# Patient Record
Sex: MALE | Race: WHITE | Employment: FULL TIME
[De-identification: names, ages, dates, MRNs, and addresses within clinical notes are randomized per-mention and may not be internally consistent; named-entity substitution may affect disease eponyms.]

---

## 2022-08-24 ENCOUNTER — TELEPHONE (OUTPATIENT)
Dept: INTERNAL MEDICINE CLINIC | Facility: CLINIC | Age: 47
End: 2022-08-24

## 2022-08-24 NOTE — TELEPHONE ENCOUNTER
[facility] for patient - Dr. Jeremie Rios is not accepting new patients at this time, if he would like to see Dr. Sloane Box, that can be arranged. Please call the office at [phone] to schedule.

## 2022-08-24 NOTE — TELEPHONE ENCOUNTER
----- Message from Rosalind Coronado sent at 8/11/2022  3:58 PM EDT -----  Subject: Appointment Request    Reason for Call: New Patient/New to Provider Appointment needed: New   Patient Request Appointment    QUESTIONS    Reason for appointment request? Available appointments did not meet   patient need     Additional Information for Provider? Easton Reid is inquiring on becoming a NP   for Dr. Ryan Lagunas if possible he would like to see Dr. Gregg Carlsonald is first   choice as his wife Ambrocio Villa ([id number]) sees him, but his question would be if   he would be able to have a physical on first appt as it is needed for   insurance purposes.  Please call to let him know the answers to these   question and set him up.  ---------------------------------------------------------------------------  --------------  [address] [facility]  [phone]; OK to leave message on voicemail  ---------------------------------------------------------------------------  --------------  SCRIPT ANSWERS  COVID Screen: Sony Washburn

## 2022-09-13 NOTE — PROGRESS NOTES
Curtis Diaz M.D. Family Medicine and Sports Medicine  [address] [address] [address] [place] [address]  Office : [phone]  Fax : [phone]    Chief Complaint   Patient presents with    New Patient     The pt does have a few things he would like to discuss today, but did not share those things with me. History of Present Illness:  Lesvia BowmanChristine Capellan is a 52 y.o. male. HPI  52 y.o. male present to establish care with a new provider. Has not seen PCP in years. Was seen at [address] [place] [address] previously. Past medical history of:  GERD: Endorses hx of reflux and esophageal spasms. Has avoided caffeine. S/p EGD and biopsy. Hx of achalasia s/p dilation >5 years ago. Takes tagamet daily. Completed by Gastroenterology associated per patient. Currently well controlled. HLD: Not on any medications. Admits to some chronic elevations. Fhx of lipitor. Admits to poor diet at times including sweets. Elevated BP: Hx of borderline Bps. Endorses stress with work. Issues to address:  Right hip pain around 5 years. Worse in past year. Pt runs 1x per week. Does mountain biking 1x per week. Pain along anterior hip in C sign. Exacerbated after exercise. BL hand pain:  Family hx of arthritis in hands in feet  Frequent use of hands (lifting, chopping wood, riding motorcycle). Will have pain along MCP of right 5th and MCP of left 2nd  Will also have pain along right middle phalanx  Takes ibuprofen, helps some      Past Medical History:  Past Medical History:   Diagnosis Date    GERD (gastroesophageal reflux disease)      Past Surgical History:  Past Surgical History:   Procedure Laterality Date    TONSILLECTOMY       Allergies:   No Known Allergies  Medications:   Prior to Admission medications    Medication Sig Start Date End Date Taking?  Authorizing Provider   Cimetidine (TAGAMET PO) Take 1 tablet by mouth every evening   Yes Historical Provider, MD   terbinafine (LAMISIL) 250 MG tablet Take 1 tablet by mouth daily 9/15/22 12/14/22 Yes Jayda Ruiz MD      Social History:  Social History     Tobacco Use    Smoking status: Never    Smokeless tobacco: Never   Substance Use Topics    Alcohol use: Yes     Comment: 3-4 a week - mix     Family History  Family History   Problem Relation Age of Onset    Arthritis Mother     High Cholesterol Mother     High Blood Pressure Father     Heart Disease Father        ROS:  All other ROS were negative. See HPI for pertinent ROS      Vital Signs  /78 (Site: Right Upper Arm, Position: Sitting, Cuff Size: Large Adult)   Pulse 73   Temp 98.4 °F (36.9 °C) (Temporal)   Ht 5' 10.2\" (1.783 m)   Wt 193 lb 3.2 oz (87.6 kg)   SpO2 98%   BMI 27.56 kg/m²   Body mass index is 27.56 kg/m². Physical Exam  Vitals and nursing note reviewed. Constitutional:       General: He is not in acute distress. Appearance: He is normal weight. He is not ill-appearing. HENT:      Head: Normocephalic and atraumatic. Eyes:      Extraocular Movements: Extraocular movements intact. Conjunctiva/sclera: Conjunctivae normal.   Cardiovascular:      Rate and Rhythm: Normal rate and regular rhythm. Pulses: Normal pulses. Heart sounds: Normal heart sounds. Pulmonary:      Effort: Pulmonary effort is normal.      Breath sounds: Normal breath sounds. Musculoskeletal:         General: No deformity. Comments: Right hip: Decreased range of motion diffusely compared to left, specifically noted with DENISE and straight leg raise. Pain with FADIR. Mild discomfort with logroll. Mild tenderness to palpation along the distal gluteus medius/piriformis   Skin:     General: Skin is warm. Comments: Thickening and yellowing of bilateral great toenails   Neurological:      General: No focal deficit present. Mental Status: He is alert and oriented to person, place, and time. Mental status is at baseline. Psychiatric:         Mood and Affect: Mood normal.         Behavior: Behavior normal.       PMH, PSH, SHx, FHx, Allergies, Medications reviewed and updated as indicated    Assessment/Plan:  Tylor Gonzales was seen today for new patient. Diagnoses and all orders for this visit:    Establishing care with new doctor, encounter for  Patient presents to establish care with a new doctor. The  PMH, Fhx, Shx, Allergies, Medications were reviewed from any prior documentation available as well as discussed with the patient. This history has been documented into the chart. Recommended lifestyle changes including proper diet and exercise were discussed. Recommended screenings and baseline labs were reviewed with patient based off age and gender. All questions were answered at this time. Preventative health care  -     CBC with Auto Differential; Future  -     Comprehensive Metabolic Panel; Future  -     Lipid Panel; Future  -     Hemoglobin A1C; Future    Gastroesophageal reflux disease, unspecified whether esophagitis present  Endorses hx of reflux and esophageal spasms. Has avoided caffeine. S/p EGD and biopsy. Hx of achalasia s/p dilation >5 years ago. Takes tagamet daily. Completed by Gastroenterology associated per patient. Currently well controlled. Screening for diabetes mellitus  -     Comprehensive Metabolic Panel; Future  -     Hemoglobin A1C; Future    Encounter for lipid screening for cardiovascular disease  Mixed hyperlipidemia  -     CBC with Auto Differential; Future  -     Comprehensive Metabolic Panel; Future  -     Lipid Panel; Future  -     Hemoglobin A1C; Future    Overweight (BMI 25.0-29. 9)    Family history of rheumatoid arthritis  -     MEET, Direct, w/Reflex; Future  -     C-Reactive Protein; Future  -     Rheumatoid Factor; Future  -     Sedimentation Rate;  Future    Diffuse arthralgia  Patient describes arthralgias in bilateral hands, specifically along the MCP joints of the right fifth and left second. Family history of autoimmune disease. We will check baseline screening  -     MEET, Direct, w/Reflex; Future  -     C-Reactive Protein; Future  -     Rheumatoid Factor; Future  -     Sedimentation Rate; Future    Right hip pain  Chronic right hip pain. Patient certainly has mobility issues with decreased external rotation, hamstring tightness and decreased hip flexion on the right compared to left. History and physical also consistent with likely some intra-articular issue, consideration for hip arthritis. He wishes to hold on physical therapy. Home exercises and resources provided. Will check x-ray. Discussed as needed analgesics and conservative measures  -     XR HIP BILATERAL W AP PELVIS (2 VIEWS); Future    Onychomycosis  Bilateral great toenail thickening and yellowing. Discussed options. Will check CMP. Terbinafine for 12 weeks. -     terbinafine (LAMISIL) 250 MG tablet; Take 1 tablet by mouth daily    Prostate cancer screening  -     PSA Screening; Future        Return in about 6 weeks (around 10/27/2022), or if symptoms worsen or fail to improve, for right hip, BP recheck, lab review. 61 minutes was spent on the day of this encounter including face to face time with patient, chart review, reviewing pt hx, ordering meds/tests/procedures, documenting, and/or interpreting results.     __  Brodie Loving M.D.

## 2022-09-15 ENCOUNTER — OFFICE VISIT (OUTPATIENT)
Dept: INTERNAL MEDICINE CLINIC | Facility: CLINIC | Age: 47
End: 2022-09-15
Payer: COMMERCIAL

## 2022-09-15 ENCOUNTER — TELEPHONE (OUTPATIENT)
Dept: INTERNAL MEDICINE CLINIC | Facility: CLINIC | Age: 47
End: 2022-09-15

## 2022-09-15 ENCOUNTER — HOSPITAL ENCOUNTER (OUTPATIENT)
Dept: GENERAL RADIOLOGY | Age: 47
Discharge: HOME | End: 2022-09-18
Payer: COMMERCIAL

## 2022-09-15 VITALS
SYSTOLIC BLOOD PRESSURE: 136 MMHG | DIASTOLIC BLOOD PRESSURE: 78 MMHG | WEIGHT: 193.2 LBS | HEIGHT: 70 IN | BODY MASS INDEX: 27.66 KG/M2 | OXYGEN SATURATION: 98 % | HEART RATE: 73 BPM | TEMPERATURE: 98.4 F

## 2022-09-15 DIAGNOSIS — Z82.61 FAMILY HISTORY OF RHEUMATOID ARTHRITIS: ICD-10-CM

## 2022-09-15 DIAGNOSIS — K21.9 GASTROESOPHAGEAL REFLUX DISEASE, UNSPECIFIED WHETHER ESOPHAGITIS PRESENT: ICD-10-CM

## 2022-09-15 DIAGNOSIS — M25.50 DIFFUSE ARTHRALGIA: ICD-10-CM

## 2022-09-15 DIAGNOSIS — Z13.6 ENCOUNTER FOR LIPID SCREENING FOR CARDIOVASCULAR DISEASE: ICD-10-CM

## 2022-09-15 DIAGNOSIS — Z76.89 ESTABLISHING CARE WITH NEW DOCTOR, ENCOUNTER FOR: Primary | ICD-10-CM

## 2022-09-15 DIAGNOSIS — Z12.5 PROSTATE CANCER SCREENING: ICD-10-CM

## 2022-09-15 DIAGNOSIS — E66.3 OVERWEIGHT (BMI 25.0-29.9): ICD-10-CM

## 2022-09-15 DIAGNOSIS — M25.551 RIGHT HIP PAIN: ICD-10-CM

## 2022-09-15 DIAGNOSIS — Z13.220 ENCOUNTER FOR LIPID SCREENING FOR CARDIOVASCULAR DISEASE: ICD-10-CM

## 2022-09-15 DIAGNOSIS — E78.2 MIXED HYPERLIPIDEMIA: ICD-10-CM

## 2022-09-15 DIAGNOSIS — Z13.1 SCREENING FOR DIABETES MELLITUS: ICD-10-CM

## 2022-09-15 DIAGNOSIS — B35.1 ONYCHOMYCOSIS: ICD-10-CM

## 2022-09-15 DIAGNOSIS — Z00.00 PREVENTATIVE HEALTH CARE: ICD-10-CM

## 2022-09-15 PROCEDURE — 99205 OFFICE O/P NEW HI 60 MIN: CPT | Performed by: FAMILY MEDICINE

## 2022-09-15 PROCEDURE — 73521 X-RAY EXAM HIPS BI 2 VIEWS: CPT

## 2022-09-15 RX ORDER — TERBINAFINE HYDROCHLORIDE 250 MG/1
250 TABLET ORAL DAILY
Qty: 90 TABLET | Refills: 0 | Status: SHIPPED | OUTPATIENT
Start: 2022-09-15 | End: 2022-12-14

## 2022-09-15 ASSESSMENT — ANXIETY QUESTIONNAIRES
3. WORRYING TOO MUCH ABOUT DIFFERENT THINGS: 0
5. BEING SO RESTLESS THAT IT IS HARD TO SIT STILL: 0
4. TROUBLE RELAXING: 0
7. FEELING AFRAID AS IF SOMETHING AWFUL MIGHT HAPPEN: 0
6. BECOMING EASILY ANNOYED OR IRRITABLE: 0
2. NOT BEING ABLE TO STOP OR CONTROL WORRYING: 0
1. FEELING NERVOUS, ANXIOUS, OR ON EDGE: 0
GAD7 TOTAL SCORE: 0

## 2022-09-15 ASSESSMENT — PATIENT HEALTH QUESTIONNAIRE - PHQ9
1. LITTLE INTEREST OR PLEASURE IN DOING THINGS: 0
2. FEELING DOWN, DEPRESSED OR HOPELESS: 0
SUM OF ALL RESPONSES TO PHQ QUESTIONS 1-9: 0
SUM OF ALL RESPONSES TO PHQ9 QUESTIONS 1 & 2: 0

## 2022-09-15 NOTE — TELEPHONE ENCOUNTER
Call patient regarding results. Discussed prior history of an acetabular injury which she does relay a injury in his 25s that may be contributing to the bone fragments. Discussed no significant arthritis noted. Discussed additional work-up options including MRI or ultrasound looking for labral injury. As of now, he will work on exercises discussed in clinic.   Follow-up for recheck in a month

## 2022-09-15 NOTE — PATIENT INSTRUCTIONS
Patient Education        Hip Arthritis: Care Instructions  Overview     Arthritis, also called osteoarthritis, is a breakdown of the tissue (cartilage) that cushions your joints. Many people have some arthritis as they age. When the cartilage in your hip joints wears down, your hip bone rubs against the hip socket. This causes pain and stiffness. Work with your doctor to find the right mix of treatments for your arthritis. There are things you can do at home to protect your hip joints, ease your pain, and help you stay active. But if your arthritis becomes so bad that you cannot walk, you may need surgery to replace the hip joint. Follow-up care is a key part of your treatment and safety. Be sure to make and go to all appointments, and call your doctor if you are having problems. It's also a good idea to know your test results and keep a list of the medicines you take. How can you care for yourself at home? Stay at a healthy weight. Being overweight puts extra strain on your hip joints. Talk to your doctor or physical therapist about exercises that will help ease hip pain. These tips may help. Stretch to help prevent stiffness and to prevent injury before you exercise. You may enjoy gentle forms of yoga to help keep your joints and muscles flexible. Walk instead of jog. Other types of exercise that are less stressful on the joints include riding a bike, swimming, and doing water exercise. Lift weights. Strong muscles help reduce stress on your joints. Stronger thigh muscles, for example, take some of the stress off of the knees and hips. Learn the right way to lift weights so you do not make joint pain worse. Take pain medicines exactly as directed. If the doctor gave you a prescription medicine for pain, take it as prescribed. If you are not taking a prescription pain medicine, ask your doctor if you can take an over-the-counter medicine.   Use a cane, crutch, walker, or another device if you need help to get around. These can help rest your hips. You also can use other things to make life easier, such as a higher toilet seat. Do not sit in low chairs, which can make it painful to get up. Put heat or cold on your sore hips as needed. Use whichever helps you most. You also can go back and forth between hot and cold packs. Apply heat 2 or 3 times a day for 20 to 30 minutes--using a heating pad, hot shower, or hot pack--to relieve pain and stiffness. Put ice or a cold pack on your sore hips for 10 to 20 minutes at a time to numb the area. Put a thin cloth between the ice and your skin. Think about talking to your doctor about using capsaicin, a cream you apply to the skin for pain relief. When should you call for help? Call your doctor now or seek immediate medical care if:    You have sudden swelling, warmth, or pain in any joint. You have joint pain and a fever or rash. You have such bad pain that you cannot use the joint. Watch closely for changes in your health, and be sure to contact your doctor if:    You have mild joint symptoms that continue even with more than 6 weeks of care at home. You do not get better as expected. You have stomach pain or other problems with your medicine. Where can you learn more? Go to https://[employer]. org and sign in to your [employer] account. Enter P450 in the [facility] box to learn more about \"Hip Arthritis: Care Instructions. \"     If you do not have an account, please click on the \"Sign Up Now\" link. Current as of: March 9, 2022               Content Version: 13.4  © 1492-2396 Healthwise, Incorporated. Care instructions adapted under license by [facility] ([facility]). If you have questions about a medical condition or this instruction, always ask your healthcare professional. [address] any warranty or liability for your use of this information.

## 2022-09-16 LAB
ALBUMIN SERPL-MCNC: 4.4 G/DL (ref 3.5–5)
ALBUMIN/GLOB SERPL: 1.6 {RATIO} (ref 1.2–3.5)
ALP SERPL-CCNC: 49 U/L (ref 50–136)
ALT SERPL-CCNC: 43 U/L (ref 12–65)
ANION GAP SERPL CALC-SCNC: 4 MMOL/L (ref 4–13)
AST SERPL-CCNC: 16 U/L (ref 15–37)
BASOPHILS # BLD: 0.1 K/UL (ref 0–0.2)
BASOPHILS NFR BLD: 1 % (ref 0–2)
BILIRUB SERPL-MCNC: 0.7 MG/DL (ref 0.2–1.1)
BUN SERPL-MCNC: 11 MG/DL (ref 6–23)
CALCIUM SERPL-MCNC: 9.5 MG/DL (ref 8.3–10.4)
CHLORIDE SERPL-SCNC: 104 MMOL/L (ref 101–110)
CHOLEST SERPL-MCNC: 253 MG/DL
CO2 SERPL-SCNC: 29 MMOL/L (ref 21–32)
CREAT SERPL-MCNC: 1.1 MG/DL (ref 0.8–1.5)
CRP SERPL-MCNC: <0.3 MG/DL (ref 0–0.9)
DIFFERENTIAL METHOD BLD: NORMAL
EOSINOPHIL # BLD: 0.1 K/UL (ref 0–0.8)
EOSINOPHIL NFR BLD: 2 % (ref 0.5–7.8)
ERYTHROCYTE [DISTWIDTH] IN BLOOD BY AUTOMATED COUNT: 12.7 % (ref 11.9–14.6)
ERYTHROCYTE [SEDIMENTATION RATE] IN BLOOD: 2 MM/HR (ref 0–20)
EST. AVERAGE GLUCOSE BLD GHB EST-MCNC: 111 MG/DL
GLOBULIN SER CALC-MCNC: 2.8 G/DL (ref 2.3–3.5)
GLUCOSE SERPL-MCNC: 100 MG/DL (ref 65–100)
HBA1C MFR BLD: 5.5 % (ref 4.8–5.6)
HCT VFR BLD AUTO: 46.1 % (ref 41.1–50.3)
HDLC SERPL-MCNC: 57 MG/DL (ref 40–60)
HDLC SERPL: 4.4 {RATIO}
HGB BLD-MCNC: 15 G/DL (ref 13.6–17.2)
IMM GRANULOCYTES # BLD AUTO: 0 K/UL (ref 0–0.5)
IMM GRANULOCYTES NFR BLD AUTO: 0 % (ref 0–5)
LDLC SERPL CALC-MCNC: 176.2 MG/DL
LYMPHOCYTES # BLD: 1.6 K/UL (ref 0.5–4.6)
LYMPHOCYTES NFR BLD: 29 % (ref 13–44)
MCH RBC QN AUTO: 30.1 PG (ref 26.1–32.9)
MCHC RBC AUTO-ENTMCNC: 32.5 G/DL (ref 31.4–35)
MCV RBC AUTO: 92.4 FL (ref 79.6–97.8)
MONOCYTES # BLD: 0.5 K/UL (ref 0.1–1.3)
MONOCYTES NFR BLD: 9 % (ref 4–12)
NEUTS SEG # BLD: 3.1 K/UL (ref 1.7–8.2)
NEUTS SEG NFR BLD: 59 % (ref 43–78)
NRBC # BLD: 0 K/UL (ref 0–0.2)
PLATELET # BLD AUTO: 242 K/UL (ref 150–450)
PMV BLD AUTO: 10.4 FL (ref 9.4–12.3)
POTASSIUM SERPL-SCNC: 3.9 MMOL/L (ref 3.5–5.1)
PROT SERPL-MCNC: 7.2 G/DL (ref 6.3–8.2)
PSA SERPL-MCNC: 1 NG/ML
RBC # BLD AUTO: 4.99 M/UL (ref 4.23–5.6)
RHEUMATOID FACT SER QL LA: NEGATIVE
SODIUM SERPL-SCNC: 137 MMOL/L (ref 138–145)
TRIGL SERPL-MCNC: 99 MG/DL (ref 35–150)
VLDLC SERPL CALC-MCNC: 19.8 MG/DL (ref 6–23)
WBC # BLD AUTO: 5.4 K/UL (ref 4.3–11.1)

## 2022-09-17 LAB — ANA SER QL: NEGATIVE

## 2022-09-19 ENCOUNTER — TELEPHONE (OUTPATIENT)
Dept: INTERNAL MEDICINE CLINIC | Facility: CLINIC | Age: 47
End: 2022-09-19

## 2022-09-19 NOTE — TELEPHONE ENCOUNTER
Pt was calling back about results, states that a voicemail was left for him. He also says that it is okay for a detailed message to be left on his voicemail.

## 2022-11-01 NOTE — PROGRESS NOTES
Medications:   Prior to Admission medications    Medication Sig Start Date End Date Taking? Authorizing Provider   Cimetidine (TAGAMET PO) Take 1 tablet by mouth every evening   Yes Historical Provider, MD   terbinafine (LAMISIL) 250 MG tablet Take 1 tablet by mouth daily 9/15/22 12/14/22 Yes Renny Becker MD        ROS:  All other pertinent ROS are negative. See HPI for pertinent ROS    Vital Signs  /74 (Site: Right Upper Arm, Position: Sitting, Cuff Size: Large Adult)   Pulse 73   Temp 98.5 °F (36.9 °C) (Temporal)   Ht 5' 10.2\" (1.783 m)   Wt 194 lb 3.2 oz (88.1 kg)   SpO2 98%   BMI 27.71 kg/m²   Body mass index is 27.71 kg/m². Physical Exam  Vitals and nursing note reviewed. Constitutional:       General: He is not in acute distress. Appearance: He is normal weight. He is not ill-appearing. HENT:      Head: Normocephalic and atraumatic. Eyes:      Extraocular Movements: Extraocular movements intact. Conjunctiva/sclera: Conjunctivae normal.   Musculoskeletal:         General: No deformity. Skin:     General: Skin is warm. Neurological:      General: No focal deficit present. Mental Status: He is alert and oriented to person, place, and time. Mental status is at baseline. Psychiatric:         Mood and Affect: Mood normal.         Behavior: Behavior normal.       PMH, PSH, SHx, FHx, Allergies, Medications reviewed and updated as indicated    Assessment/Plan:  Jenise Shaffer was seen today for hypertension. Diagnoses and all orders for this visit:    Chronic right hip pain  Continue mobility exercises. Declined formal PT. Resources for home exercises given. Recheck in the future  Onychomycosis  Continue terbinafine for another month. Recheck CMP  -     Comprehensive Metabolic Panel; Future    Mixed hyperlipidemia  Work on diet and exercise. Nutrition discussed. Recheck in 4 months  -     Comprehensive Metabolic Panel;  Future    Elevated blood pressure reading  Improved. Continue to monitor. Patient to keep blood pressure log  -     Comprehensive Metabolic Panel; Future         Return in about 4 months (around 3/3/2023), or if symptoms worsen or fail to improve, for HLD, Routine Check Up, BP recheck. 35 minutes was spent on the day of this encounter including face to face time with patient, chart review, reviewing pt hx, ordering meds/tests/procedures, documenting, and/or interpreting results.      __  Niko Sanches M.D.

## 2022-11-03 ENCOUNTER — OFFICE VISIT (OUTPATIENT)
Dept: INTERNAL MEDICINE CLINIC | Facility: CLINIC | Age: 47
End: 2022-11-03
Payer: COMMERCIAL

## 2022-11-03 VITALS
WEIGHT: 194.2 LBS | OXYGEN SATURATION: 98 % | HEIGHT: 70 IN | HEART RATE: 73 BPM | BODY MASS INDEX: 27.8 KG/M2 | SYSTOLIC BLOOD PRESSURE: 132 MMHG | TEMPERATURE: 98.5 F | DIASTOLIC BLOOD PRESSURE: 74 MMHG

## 2022-11-03 DIAGNOSIS — M25.551 CHRONIC RIGHT HIP PAIN: Primary | ICD-10-CM

## 2022-11-03 DIAGNOSIS — G89.29 CHRONIC RIGHT HIP PAIN: Primary | ICD-10-CM

## 2022-11-03 DIAGNOSIS — E78.2 MIXED HYPERLIPIDEMIA: ICD-10-CM

## 2022-11-03 DIAGNOSIS — B35.1 ONYCHOMYCOSIS: ICD-10-CM

## 2022-11-03 DIAGNOSIS — R03.0 ELEVATED BLOOD PRESSURE READING: ICD-10-CM

## 2022-11-03 LAB
ALBUMIN SERPL-MCNC: 4.2 G/DL (ref 3.5–5)
ALBUMIN/GLOB SERPL: 1.4 {RATIO} (ref 0.4–1.6)
ALP SERPL-CCNC: 52 U/L (ref 50–136)
ALT SERPL-CCNC: 36 U/L (ref 12–65)
ANION GAP SERPL CALC-SCNC: 5 MMOL/L (ref 2–11)
AST SERPL-CCNC: 15 U/L (ref 15–37)
BILIRUB SERPL-MCNC: 0.4 MG/DL (ref 0.2–1.1)
BUN SERPL-MCNC: 10 MG/DL (ref 6–23)
CALCIUM SERPL-MCNC: 9.7 MG/DL (ref 8.3–10.4)
CHLORIDE SERPL-SCNC: 104 MMOL/L (ref 101–110)
CO2 SERPL-SCNC: 28 MMOL/L (ref 21–32)
CREAT SERPL-MCNC: 1.1 MG/DL (ref 0.8–1.5)
GLOBULIN SER CALC-MCNC: 2.9 G/DL (ref 2.8–4.5)
GLUCOSE SERPL-MCNC: 101 MG/DL (ref 65–100)
POTASSIUM SERPL-SCNC: 4.4 MMOL/L (ref 3.5–5.1)
PROT SERPL-MCNC: 7.1 G/DL (ref 6.3–8.2)
SODIUM SERPL-SCNC: 137 MMOL/L (ref 133–143)

## 2022-11-03 PROCEDURE — 99214 OFFICE O/P EST MOD 30 MIN: CPT | Performed by: FAMILY MEDICINE

## 2023-03-08 ENCOUNTER — OFFICE VISIT (OUTPATIENT)
Dept: INTERNAL MEDICINE CLINIC | Facility: CLINIC | Age: 48
End: 2023-03-08
Payer: COMMERCIAL

## 2023-03-08 VITALS
BODY MASS INDEX: 28.4 KG/M2 | SYSTOLIC BLOOD PRESSURE: 118 MMHG | WEIGHT: 198.4 LBS | TEMPERATURE: 98.3 F | HEIGHT: 70 IN | RESPIRATION RATE: 12 BRPM | DIASTOLIC BLOOD PRESSURE: 72 MMHG | HEART RATE: 68 BPM | OXYGEN SATURATION: 99 %

## 2023-03-08 DIAGNOSIS — E78.00 PURE HYPERCHOLESTEROLEMIA: ICD-10-CM

## 2023-03-08 DIAGNOSIS — Z12.12 ENCOUNTER FOR SCREENING FOR COLORECTAL MALIGNANT NEOPLASM: ICD-10-CM

## 2023-03-08 DIAGNOSIS — E78.00 PURE HYPERCHOLESTEROLEMIA: Primary | ICD-10-CM

## 2023-03-08 DIAGNOSIS — Z12.11 ENCOUNTER FOR SCREENING FOR COLORECTAL MALIGNANT NEOPLASM: ICD-10-CM

## 2023-03-08 DIAGNOSIS — E66.3 OVERWEIGHT (BMI 25.0-29.9): ICD-10-CM

## 2023-03-08 LAB
CHOLEST SERPL-MCNC: 234 MG/DL
HDLC SERPL-MCNC: 57 MG/DL (ref 40–60)
HDLC SERPL: 4.1
LDLC SERPL CALC-MCNC: 161 MG/DL
TRIGL SERPL-MCNC: 80 MG/DL (ref 35–150)
VLDLC SERPL CALC-MCNC: 16 MG/DL (ref 6–23)

## 2023-03-08 PROCEDURE — 99214 OFFICE O/P EST MOD 30 MIN: CPT | Performed by: INTERNAL MEDICINE

## 2023-03-08 RX ORDER — LORATADINE 10 MG/1
10 TABLET ORAL DAILY
COMMUNITY

## 2023-03-08 SDOH — ECONOMIC STABILITY: FOOD INSECURITY: WITHIN THE PAST 12 MONTHS, YOU WORRIED THAT YOUR FOOD WOULD RUN OUT BEFORE YOU GOT MONEY TO BUY MORE.: NEVER TRUE

## 2023-03-08 SDOH — ECONOMIC STABILITY: INCOME INSECURITY: HOW HARD IS IT FOR YOU TO PAY FOR THE VERY BASICS LIKE FOOD, HOUSING, MEDICAL CARE, AND HEATING?: NOT HARD AT ALL

## 2023-03-08 SDOH — ECONOMIC STABILITY: HOUSING INSECURITY
IN THE LAST 12 MONTHS, WAS THERE A TIME WHEN YOU DID NOT HAVE A STEADY PLACE TO SLEEP OR SLEPT IN A SHELTER (INCLUDING NOW)?: NO

## 2023-03-08 SDOH — ECONOMIC STABILITY: FOOD INSECURITY: WITHIN THE PAST 12 MONTHS, THE FOOD YOU BOUGHT JUST DIDN'T LAST AND YOU DIDN'T HAVE MONEY TO GET MORE.: NEVER TRUE

## 2023-03-08 ASSESSMENT — PATIENT HEALTH QUESTIONNAIRE - PHQ9
SUM OF ALL RESPONSES TO PHQ QUESTIONS 1-9: 0
1. LITTLE INTEREST OR PLEASURE IN DOING THINGS: 0
SUM OF ALL RESPONSES TO PHQ QUESTIONS 1-9: 0
2. FEELING DOWN, DEPRESSED OR HOPELESS: 0
SUM OF ALL RESPONSES TO PHQ9 QUESTIONS 1 & 2: 0

## 2023-03-08 ASSESSMENT — ANXIETY QUESTIONNAIRES
GAD7 TOTAL SCORE: 0
5. BEING SO RESTLESS THAT IT IS HARD TO SIT STILL: 0
3. WORRYING TOO MUCH ABOUT DIFFERENT THINGS: 0
IF YOU CHECKED OFF ANY PROBLEMS ON THIS QUESTIONNAIRE, HOW DIFFICULT HAVE THESE PROBLEMS MADE IT FOR YOU TO DO YOUR WORK, TAKE CARE OF THINGS AT HOME, OR GET ALONG WITH OTHER PEOPLE: NOT DIFFICULT AT ALL
7. FEELING AFRAID AS IF SOMETHING AWFUL MIGHT HAPPEN: 0
6. BECOMING EASILY ANNOYED OR IRRITABLE: 0
2. NOT BEING ABLE TO STOP OR CONTROL WORRYING: 0
1. FEELING NERVOUS, ANXIOUS, OR ON EDGE: 0
4. TROUBLE RELAXING: 0

## 2023-03-08 NOTE — PROGRESS NOTES
Leonardo Sanchez D.O. [facility]  [address], [facility] [id number]  Tel: [phone]    Office Visit: Follow Up     Patient Name: Delmy Samano   :  1975   MRN:   [id number]      Today's Date: 23 10:09 AM    Subjective     The patient is a 52y.o.-year-old male who presents for follow-up. Acid reflux, history of esophageal stricture with EGD and dilation   -Tagamet and manages with diet  -he was following with GI in the past, he hasn't seen one in 10 years    History of elevated blood pressure readings without a diagnosis of hypertension  -BP stable today  -he has been exercising recently, he has not been eating healthy recently     Hyperlipidemia  -he has been exercising recently, he has not been eating healthy recently   -last .2 and tchol 253 09/15/2022  -Mother's side has high cholesterol     Overweight  -BMI 28.31, weight 198 lbs    Health maintenance  -Due for colonoscopy    Today:     Review of Systems   All other systems reviewed and are negative.      Past Medical History:   Diagnosis Date    GERD (gastroesophageal reflux disease)     Mixed hyperlipidemia 9/15/2022    Overweight (BMI 25.0-29.9) 9/15/2022    Rash 2016     Social History     Socioeconomic History    Marital status:      Spouse name: Not on file    Number of children: Not on file    Years of education: Not on file    Highest education level: Not on file   Occupational History    Not on file   Tobacco Use    Smoking status: Never    Smokeless tobacco: Never   Vaping Use    Vaping Use: Never used   Substance and Sexual Activity    Alcohol use: Yes     Comment: 3-4 a week - mix    Drug use: Never    Sexual activity: Not on file   Other Topics Concern    Not on file   Social History Narrative    Not on file     Social Determinants of Health     Financial Resource Strain: Low Risk     Difficulty of Paying Living Expenses: Not hard at all   Food Insecurity: No Food Insecurity Worried About [address] in the Last Year: Never true    [address] [facility] [address] in the Last Year: Never true   Transportation Needs: Unknown    Lack of Transportation (Medical): Not on file    Lack of Transportation (Non-Medical): No   Physical Activity: Not on file   Stress: Not on file   Social Connections: Not on file   Intimate Partner Violence: Not on file   Housing Stability: Unknown    Unable to Pay for Housing in the Last Year: Not on file    Number of Places Lived in the Last Year: Not on file    Unstable Housing in the Last Year: No         Current Outpatient Medications   Medication Sig    loratadine (CLARITIN) 10 MG tablet Take 10 mg by mouth daily    Cimetidine (TAGAMET PO) Take 1 tablet by mouth every evening     No current facility-administered medications for this visit. Objective     Vitals:    03/08/23 0848   BP: 118/72   Site: Left Upper Arm   Position: Sitting   Cuff Size: Large Adult   Pulse: 68   Resp: 12   Temp: 98.3 °F (36.8 °C)   TempSrc: Temporal   SpO2: 99%   Weight: 198 lb 6.4 oz (90 kg)   Height: 5' 10.2\" (1.783 m)      Physical Exam:  Constitutional: Appears well kempt. Alert/oriented x3. In no acute distress. Head: Normocephalic No trauma. No deformity. Cardiac: Heart with normal rate/rhythm. No murmurs. No gallops. Pulses normal.   Pulmonary: Lungs clear to auscultation bilaterally. In no respiratory distress. No wheezing. No rales. No rhonci. Psychiatric: Normal thought content. Normal behavior. Normal judgment. Recommendations     Assessment:  Patient Active Problem List   Diagnosis    GERD (gastroesophageal reflux disease)    Mixed hyperlipidemia    Overweight (BMI 25.0-29. 9)    Pure hypercholesterolemia        Status of above conditions:     Plan:  Acid reflux, history of esophageal stricture with EGD and dilation   -Continue Tagamet  -GI referral for colonoscopy and also advised patient to discuss his history of esophageal stricture and esophageal symptoms and ongoing symptoms with certain foods for possible repeat EGD    History of elevated blood pressure readings without a diagnosis of hypertension  -BP stable today  -Advised patient to continue trying to eat healthy and exercising daily if he can    Hyperlipidemia  -Patient requesting to check lipids today, we will order lipid panel  -Nutrition referral  -Advised patient to eat diet low in carbohydrates and fats and cholesterol and try to exercise daily    Overweight  -BMI 28.31, weight 198 lbs  -Advised patient to eat diet low in carbohydrates and fats and cholesterol and try to exercise daily    Health maintenance  -Colonoscopy ordered    -Previous medical records and/or labs/tests available to me reviewed, any records outstanding not available requested  -The risks, benefits, and medical necessity of all medications, tests labs, and any other orders that were ordered at today's visit were discussed with the patient including all elective or patient requested orders. Decision to order or not order tests or based on this risk/benefit ratio and medical necessity.  -The patient expresses understanding of the plan as I've explained it to him and is in agreement with the current plan. Follow Up:      Total Time: 30 minutes (chart reviewing and in-exam time)    Signed: Harry Brar D.O.  03/08/23  10:09 AM

## 2023-09-07 ENCOUNTER — TELEPHONE (OUTPATIENT)
Dept: INTERNAL MEDICINE CLINIC | Facility: CLINIC | Age: 48
End: 2023-09-07

## 2023-09-07 NOTE — TELEPHONE ENCOUNTER
Attempt to notify patient of missed appointment with Abilio Del Cid was unsuccessful, voicemail to return call was made so the appointment can be rescheduled. I will also reach out to patient via [employer] if applicable to reschedule missed appointment. No Show letter will be sent.

## 2023-09-07 NOTE — TELEPHONE ENCOUNTER
----- Message from [facility] sent at 9/7/2023 10:39 AM EDT -----  Subject: Message to Provider    QUESTIONS  Information for Provider? Patient is calling in and he is wanting to know   what he needs to follow up on with his appointment. Please call him to go   over this. Thank you.   ---------------------------------------------------------------------------  --------------  Catherine OSMAN  [phone]; OK to leave message on voicemail  ---------------------------------------------------------------------------  --------------  SCRIPT ANSWERS  Relationship to Patient?  Self

## 2023-10-24 ENCOUNTER — TELEPHONE (OUTPATIENT)
Dept: INTERNAL MEDICINE CLINIC | Facility: CLINIC | Age: 48
End: 2023-10-24

## 2023-10-24 NOTE — TELEPHONE ENCOUNTER
----- Message from Kellykamillekirill Radha sent at 10/24/2023  4:56 PM EDT -----  Subject: Referral Request    Reason for referral request? pt. Nj Davidson wants to know if he can get   labs done prior to appointment on 10/27/2023 @8:40am w/Azar Bryant so the   results can be discussed at the time of the visit, please callback pt. to   further assist  Provider patient wants to be referred to(if known):     Provider Phone Number(if known):     Additional Information for Provider?   ---------------------------------------------------------------------------  --------------  [address]    [phone]; OK to leave message on voicemail  ---------------------------------------------------------------------------  --------------

## 2023-10-25 DIAGNOSIS — R03.0 ELEVATED BLOOD PRESSURE READING: ICD-10-CM

## 2023-10-25 DIAGNOSIS — Z79.899 OTHER LONG TERM (CURRENT) DRUG THERAPY: ICD-10-CM

## 2023-10-25 DIAGNOSIS — E78.2 MIXED HYPERLIPIDEMIA: Primary | ICD-10-CM

## 2023-10-26 ENCOUNTER — NURSE ONLY (OUTPATIENT)
Dept: INTERNAL MEDICINE CLINIC | Facility: CLINIC | Age: 48
End: 2023-10-26

## 2023-10-26 DIAGNOSIS — R03.0 ELEVATED BLOOD PRESSURE READING: ICD-10-CM

## 2023-10-26 DIAGNOSIS — Z79.899 OTHER LONG TERM (CURRENT) DRUG THERAPY: ICD-10-CM

## 2023-10-26 DIAGNOSIS — E78.2 MIXED HYPERLIPIDEMIA: ICD-10-CM

## 2023-10-26 LAB
ALBUMIN SERPL-MCNC: 3.9 G/DL (ref 3.5–5)
ALBUMIN/GLOB SERPL: 1.3 (ref 0.4–1.6)
ALP SERPL-CCNC: 56 U/L (ref 50–136)
ALT SERPL-CCNC: 49 U/L (ref 12–65)
ANION GAP SERPL CALC-SCNC: 6 MMOL/L (ref 2–11)
AST SERPL-CCNC: 24 U/L (ref 15–37)
BASOPHILS # BLD: 0.1 K/UL (ref 0–0.2)
BASOPHILS NFR BLD: 1 % (ref 0–2)
BILIRUB SERPL-MCNC: 0.5 MG/DL (ref 0.2–1.1)
BUN SERPL-MCNC: 14 MG/DL (ref 6–23)
CALCIUM SERPL-MCNC: 9 MG/DL (ref 8.3–10.4)
CHLORIDE SERPL-SCNC: 105 MMOL/L (ref 101–110)
CHOLEST SERPL-MCNC: 261 MG/DL
CO2 SERPL-SCNC: 27 MMOL/L (ref 21–32)
CREAT SERPL-MCNC: 1.1 MG/DL (ref 0.8–1.5)
DIFFERENTIAL METHOD BLD: NORMAL
EOSINOPHIL # BLD: 0.2 K/UL (ref 0–0.8)
EOSINOPHIL NFR BLD: 3 % (ref 0.5–7.8)
ERYTHROCYTE [DISTWIDTH] IN BLOOD BY AUTOMATED COUNT: 13 % (ref 11.9–14.6)
EST. AVERAGE GLUCOSE BLD GHB EST-MCNC: 108 MG/DL
GLOBULIN SER CALC-MCNC: 3 G/DL (ref 2.8–4.5)
GLUCOSE SERPL-MCNC: 107 MG/DL (ref 65–100)
HBA1C MFR BLD: 5.4 % (ref 4.8–5.6)
HCT VFR BLD AUTO: 42.8 % (ref 41.1–50.3)
HDLC SERPL-MCNC: 57 MG/DL (ref 40–60)
HDLC SERPL: 4.6
HGB BLD-MCNC: 14.6 G/DL (ref 13.6–17.2)
IMM GRANULOCYTES # BLD AUTO: 0 K/UL (ref 0–0.5)
IMM GRANULOCYTES NFR BLD AUTO: 0 % (ref 0–5)
LDLC SERPL CALC-MCNC: 180.6 MG/DL
LYMPHOCYTES # BLD: 2.3 K/UL (ref 0.5–4.6)
LYMPHOCYTES NFR BLD: 37 % (ref 13–44)
MCH RBC QN AUTO: 30 PG (ref 26.1–32.9)
MCHC RBC AUTO-ENTMCNC: 34.1 G/DL (ref 31.4–35)
MCV RBC AUTO: 87.9 FL (ref 82–102)
MONOCYTES # BLD: 0.6 K/UL (ref 0.1–1.3)
MONOCYTES NFR BLD: 9 % (ref 4–12)
NEUTS SEG # BLD: 3.2 K/UL (ref 1.7–8.2)
NEUTS SEG NFR BLD: 50 % (ref 43–78)
NRBC # BLD: 0 K/UL (ref 0–0.2)
PLATELET # BLD AUTO: 227 K/UL (ref 150–450)
PMV BLD AUTO: 9.8 FL (ref 9.4–12.3)
POTASSIUM SERPL-SCNC: 4.3 MMOL/L (ref 3.5–5.1)
PROT SERPL-MCNC: 6.9 G/DL (ref 6.3–8.2)
RBC # BLD AUTO: 4.87 M/UL (ref 4.23–5.6)
SODIUM SERPL-SCNC: 138 MMOL/L (ref 133–143)
TRIGL SERPL-MCNC: 117 MG/DL (ref 35–150)
VLDLC SERPL CALC-MCNC: 23.4 MG/DL (ref 6–23)
WBC # BLD AUTO: 6.3 K/UL (ref 4.3–11.1)

## 2023-10-27 ENCOUNTER — OFFICE VISIT (OUTPATIENT)
Dept: INTERNAL MEDICINE CLINIC | Facility: CLINIC | Age: 48
End: 2023-10-27
Payer: COMMERCIAL

## 2023-10-27 VITALS
BODY MASS INDEX: 28.06 KG/M2 | HEART RATE: 77 BPM | TEMPERATURE: 98.5 F | DIASTOLIC BLOOD PRESSURE: 79 MMHG | HEIGHT: 70 IN | RESPIRATION RATE: 12 BRPM | WEIGHT: 196 LBS | SYSTOLIC BLOOD PRESSURE: 122 MMHG | OXYGEN SATURATION: 98 %

## 2023-10-27 DIAGNOSIS — Z12.83 SCREENING EXAM FOR SKIN CANCER: ICD-10-CM

## 2023-10-27 DIAGNOSIS — Z23 ENCOUNTER FOR IMMUNIZATION: ICD-10-CM

## 2023-10-27 DIAGNOSIS — K21.9 GASTROESOPHAGEAL REFLUX DISEASE, UNSPECIFIED WHETHER ESOPHAGITIS PRESENT: ICD-10-CM

## 2023-10-27 DIAGNOSIS — E66.3 OVERWEIGHT (BMI 25.0-29.9): ICD-10-CM

## 2023-10-27 DIAGNOSIS — E78.00 PURE HYPERCHOLESTEROLEMIA: ICD-10-CM

## 2023-10-27 DIAGNOSIS — E78.2 MIXED HYPERLIPIDEMIA: Primary | ICD-10-CM

## 2023-10-27 PROCEDURE — 99214 OFFICE O/P EST MOD 30 MIN: CPT | Performed by: INTERNAL MEDICINE

## 2023-10-27 PROCEDURE — 90471 IMMUNIZATION ADMIN: CPT | Performed by: INTERNAL MEDICINE

## 2023-10-27 PROCEDURE — 90674 CCIIV4 VAC NO PRSV 0.5 ML IM: CPT | Performed by: INTERNAL MEDICINE

## 2023-10-27 SDOH — ECONOMIC STABILITY: FOOD INSECURITY: WITHIN THE PAST 12 MONTHS, THE FOOD YOU BOUGHT JUST DIDN'T LAST AND YOU DIDN'T HAVE MONEY TO GET MORE.: NEVER TRUE

## 2023-10-27 SDOH — ECONOMIC STABILITY: INCOME INSECURITY: HOW HARD IS IT FOR YOU TO PAY FOR THE VERY BASICS LIKE FOOD, HOUSING, MEDICAL CARE, AND HEATING?: NOT HARD AT ALL

## 2023-10-27 SDOH — ECONOMIC STABILITY: FOOD INSECURITY: WITHIN THE PAST 12 MONTHS, YOU WORRIED THAT YOUR FOOD WOULD RUN OUT BEFORE YOU GOT MONEY TO BUY MORE.: NEVER TRUE

## 2023-10-27 ASSESSMENT — PATIENT HEALTH QUESTIONNAIRE - PHQ9
SUM OF ALL RESPONSES TO PHQ QUESTIONS 1-9: 0
2. FEELING DOWN, DEPRESSED OR HOPELESS: 0
SUM OF ALL RESPONSES TO PHQ QUESTIONS 1-9: 0
SUM OF ALL RESPONSES TO PHQ QUESTIONS 1-9: 0
1. LITTLE INTEREST OR PLEASURE IN DOING THINGS: 0
SUM OF ALL RESPONSES TO PHQ QUESTIONS 1-9: 0
SUM OF ALL RESPONSES TO PHQ9 QUESTIONS 1 & 2: 0

## 2023-10-27 ASSESSMENT — ANXIETY QUESTIONNAIRES
IF YOU CHECKED OFF ANY PROBLEMS ON THIS QUESTIONNAIRE, HOW DIFFICULT HAVE THESE PROBLEMS MADE IT FOR YOU TO DO YOUR WORK, TAKE CARE OF THINGS AT HOME, OR GET ALONG WITH OTHER PEOPLE: NOT DIFFICULT AT ALL
1. FEELING NERVOUS, ANXIOUS, OR ON EDGE: 0
7. FEELING AFRAID AS IF SOMETHING AWFUL MIGHT HAPPEN: 0
2. NOT BEING ABLE TO STOP OR CONTROL WORRYING: 0
GAD7 TOTAL SCORE: 0
4. TROUBLE RELAXING: 0
3. WORRYING TOO MUCH ABOUT DIFFERENT THINGS: 0
6. BECOMING EASILY ANNOYED OR IRRITABLE: 0
5. BEING SO RESTLESS THAT IT IS HARD TO SIT STILL: 0

## 2023-10-27 NOTE — PROGRESS NOTES
Miki Terrazas D.O. [address], [address] [facility] [address]  Tel: [phone]    Office Visit: Follow Up     Patient Name: Tyrone Ferrer. Pascual Belcher   :  1975   MRN:   [id number]      Today's Date: 10/27/23 9:13 AM    Subjective     The patient is a 50y.o.-year-old male who presents for follow-up. Acid reflux, history of esophageal stricture with EGD and dilation   -Tagamet 1 tablet qhs  -He states his symptoms have reduced significantly and he states since he cut out peanut butter his symptoms have been better. History of elevated blood pressure readings without a diagnosis of hypertension  -BP stable today     Hyperlipidemia  -Lipid panel from 10/26/2023 showed tchol of 261, HDL 57, .6, Trigs 117  -Patient states he has been eating out a lot recently and hasn't been eating the best. He has not been exercising much, he states he took the summer off but is starting to get back into it now. Overweight  -BMI 28.12, weight 196 lbs  -Advised patient to eat diet low in carbohydrates and fats and cholesterol and try to exercise daily     Health maintenance  -Colonoscopy ordered, he has not scheduled this yet. Today:   Patient states he had COVID at the end of August, he was tested at work and he was told that time that he had a heart murmur.     Review of Systems     Past Medical History:   Diagnosis Date    GERD (gastroesophageal reflux disease)     Mixed hyperlipidemia 9/15/2022    Overweight (BMI 25.0-29.9) 9/15/2022    Rash 2016     Social History     Socioeconomic History    Marital status:      Spouse name: Not on file    Number of children: Not on file    Years of education: Not on file    Highest education level: Not on file   Occupational History    Not on file   Tobacco Use    Smoking status: Never     Passive exposure: Never    Smokeless tobacco: Never   Vaping Use    Vaping Use: Never used   Substance and Sexual Activity    Alcohol

## 2024-01-25 ENCOUNTER — NURSE ONLY (OUTPATIENT)
Dept: INTERNAL MEDICINE CLINIC | Facility: CLINIC | Age: 49
End: 2024-01-25

## 2024-01-25 DIAGNOSIS — E78.00 PURE HYPERCHOLESTEROLEMIA: ICD-10-CM

## 2024-01-25 LAB
CHOLEST SERPL-MCNC: 231 MG/DL
HDLC SERPL-MCNC: 53 MG/DL (ref 40–60)
HDLC SERPL: 4.4
LDLC SERPL CALC-MCNC: 154.2 MG/DL
TRIGL SERPL-MCNC: 119 MG/DL (ref 35–150)
VLDLC SERPL CALC-MCNC: 23.8 MG/DL (ref 6–23)

## 2024-01-26 NOTE — RESULT ENCOUNTER NOTE
Patient notified of results and recommendations from Dr. Azar Bryant via Viro[employer] and the patient can reach out to our office by call or V[employer] message if they have any questions, comments and/or concerns on the matter.

## 2024-01-26 NOTE — RESULT ENCOUNTER NOTE
Patient's cholesterol has improved, his total cholesterol is down to 231 from 261.  His triglycerides are stable at 119, his HDL is stable at 53, his LDL or bad cholesterol is down to 154.2 from 180.6.  He should continue eating healthy and exercising regularly to continue to bring these numbers down and keep them stable.

## 2024-07-10 ENCOUNTER — OFFICE VISIT (OUTPATIENT)
Dept: INTERNAL MEDICINE CLINIC | Facility: CLINIC | Age: 49
End: 2024-07-10
Payer: COMMERCIAL

## 2024-07-10 VITALS
HEIGHT: 70 IN | OXYGEN SATURATION: 98 % | SYSTOLIC BLOOD PRESSURE: 123 MMHG | BODY MASS INDEX: 27.49 KG/M2 | HEART RATE: 75 BPM | DIASTOLIC BLOOD PRESSURE: 83 MMHG | WEIGHT: 192 LBS | TEMPERATURE: 97.9 F

## 2024-07-10 DIAGNOSIS — Z00.00 ANNUAL VISIT FOR GENERAL ADULT MEDICAL EXAMINATION WITHOUT ABNORMAL FINDINGS: Primary | ICD-10-CM

## 2024-07-10 DIAGNOSIS — E78.2 MIXED HYPERLIPIDEMIA: ICD-10-CM

## 2024-07-10 DIAGNOSIS — B35.1 ONYCHOMYCOSIS OF GREAT TOE: ICD-10-CM

## 2024-07-10 DIAGNOSIS — E78.00 PURE HYPERCHOLESTEROLEMIA: ICD-10-CM

## 2024-07-10 DIAGNOSIS — E66.3 OVERWEIGHT (BMI 25.0-29.9): ICD-10-CM

## 2024-07-10 DIAGNOSIS — Z11.4 ENCOUNTER FOR SCREENING FOR HIV: ICD-10-CM

## 2024-07-10 DIAGNOSIS — Z12.83 SCREENING EXAM FOR SKIN CANCER: ICD-10-CM

## 2024-07-10 DIAGNOSIS — K21.9 GASTROESOPHAGEAL REFLUX DISEASE, UNSPECIFIED WHETHER ESOPHAGITIS PRESENT: ICD-10-CM

## 2024-07-10 DIAGNOSIS — Z11.59 NEED FOR HEPATITIS C SCREENING TEST: ICD-10-CM

## 2024-07-10 LAB
ALBUMIN SERPL-MCNC: 4.4 G/DL (ref 3.5–5)
ALBUMIN/GLOB SERPL: 1.6 (ref 1–1.9)
ALP SERPL-CCNC: 57 U/L (ref 40–129)
ALT SERPL-CCNC: 30 U/L (ref 12–65)
ANION GAP SERPL CALC-SCNC: 12 MMOL/L (ref 9–18)
AST SERPL-CCNC: 23 U/L (ref 15–37)
BASOPHILS # BLD: 0.1 K/UL (ref 0–0.2)
BASOPHILS NFR BLD: 1 % (ref 0–2)
BILIRUB SERPL-MCNC: 0.6 MG/DL (ref 0–1.2)
BUN SERPL-MCNC: 13 MG/DL (ref 6–23)
CALCIUM SERPL-MCNC: 9.5 MG/DL (ref 8.8–10.2)
CHLORIDE SERPL-SCNC: 101 MMOL/L (ref 98–107)
CHOLEST SERPL-MCNC: 230 MG/DL (ref 0–200)
CO2 SERPL-SCNC: 24 MMOL/L (ref 20–28)
CREAT SERPL-MCNC: 1.11 MG/DL (ref 0.8–1.3)
DIFFERENTIAL METHOD BLD: NORMAL
EOSINOPHIL # BLD: 0.1 K/UL (ref 0–0.8)
EOSINOPHIL NFR BLD: 1 % (ref 0.5–7.8)
ERYTHROCYTE [DISTWIDTH] IN BLOOD BY AUTOMATED COUNT: 12.4 % (ref 11.9–14.6)
GLOBULIN SER CALC-MCNC: 2.8 G/DL (ref 2.3–3.5)
GLUCOSE SERPL-MCNC: 103 MG/DL (ref 70–99)
HCT VFR BLD AUTO: 45.8 % (ref 41.1–50.3)
HDLC SERPL-MCNC: 42 MG/DL (ref 40–60)
HDLC SERPL: 5.5 (ref 0–5)
HGB BLD-MCNC: 14.9 G/DL (ref 13.6–17.2)
HIV 1+2 AB+HIV1 P24 AG SERPL QL IA: NONREACTIVE
HIV 1/2 RESULT COMMENT: NORMAL
IMM GRANULOCYTES # BLD AUTO: 0 K/UL (ref 0–0.5)
IMM GRANULOCYTES NFR BLD AUTO: 1 % (ref 0–5)
LDLC SERPL CALC-MCNC: 165 MG/DL (ref 0–100)
LYMPHOCYTES # BLD: 1.8 K/UL (ref 0.5–4.6)
LYMPHOCYTES NFR BLD: 30 % (ref 13–44)
MCH RBC QN AUTO: 30 PG (ref 26.1–32.9)
MCHC RBC AUTO-ENTMCNC: 32.5 G/DL (ref 31.4–35)
MCV RBC AUTO: 92.2 FL (ref 82–102)
MONOCYTES # BLD: 0.5 K/UL (ref 0.1–1.3)
MONOCYTES NFR BLD: 9 % (ref 4–12)
NEUTS SEG # BLD: 3.4 K/UL (ref 1.7–8.2)
NEUTS SEG NFR BLD: 58 % (ref 43–78)
NRBC # BLD: 0 K/UL (ref 0–0.2)
PLATELET # BLD AUTO: 286 K/UL (ref 150–450)
PMV BLD AUTO: 10 FL (ref 9.4–12.3)
POTASSIUM SERPL-SCNC: 4.4 MMOL/L (ref 3.5–5.1)
PROT SERPL-MCNC: 7.2 G/DL (ref 6.3–8.2)
RBC # BLD AUTO: 4.97 M/UL (ref 4.23–5.6)
SODIUM SERPL-SCNC: 138 MMOL/L (ref 136–145)
TRIGL SERPL-MCNC: 113 MG/DL (ref 0–150)
VLDLC SERPL CALC-MCNC: 23 MG/DL (ref 6–23)
WBC # BLD AUTO: 5.8 K/UL (ref 4.3–11.1)

## 2024-07-10 PROCEDURE — 99396 PREV VISIT EST AGE 40-64: CPT | Performed by: INTERNAL MEDICINE

## 2024-07-10 RX ORDER — TERBINAFINE HYDROCHLORIDE 250 MG/1
250 TABLET ORAL DAILY
Qty: 120 TABLET | Refills: 0 | Status: SHIPPED | OUTPATIENT
Start: 2024-07-10 | End: 2024-11-07

## 2024-07-10 SDOH — ECONOMIC STABILITY: FOOD INSECURITY: WITHIN THE PAST 12 MONTHS, YOU WORRIED THAT YOUR FOOD WOULD RUN OUT BEFORE YOU GOT MONEY TO BUY MORE.: NEVER TRUE

## 2024-07-10 SDOH — ECONOMIC STABILITY: INCOME INSECURITY: HOW HARD IS IT FOR YOU TO PAY FOR THE VERY BASICS LIKE FOOD, HOUSING, MEDICAL CARE, AND HEATING?: NOT HARD AT ALL

## 2024-07-10 SDOH — ECONOMIC STABILITY: FOOD INSECURITY: WITHIN THE PAST 12 MONTHS, THE FOOD YOU BOUGHT JUST DIDN'T LAST AND YOU DIDN'T HAVE MONEY TO GET MORE.: NEVER TRUE

## 2024-07-10 ASSESSMENT — PATIENT HEALTH QUESTIONNAIRE - PHQ9
SUM OF ALL RESPONSES TO PHQ QUESTIONS 1-9: 0
2. FEELING DOWN, DEPRESSED OR HOPELESS: NOT AT ALL
SUM OF ALL RESPONSES TO PHQ9 QUESTIONS 1 & 2: 0
SUM OF ALL RESPONSES TO PHQ QUESTIONS 1-9: 0
1. LITTLE INTEREST OR PLEASURE IN DOING THINGS: NOT AT ALL

## 2024-07-10 NOTE — PROGRESS NOTES
Azar Bryant D.O.   [place] [address]  Tel: [phone]    Office Visit: Follow Up     Patient Name: Navarro Mas   :  1975   MRN:   [id number]      Today's Date: 07/10/24 11:36 AM    Subjective     The patient is a 49 y.o.-year-old male who presents for follow-up.    Diet: he states he is eating healthier now and trying to lose weight.   Exercise: He states he is running 1-2 times a week and mountain biking as well. He states the weather is limiting his activity right now.   Alcohol: 2-3 drinks a week   Cigarettes: none   Sexually active: he is      Acid reflux, history of esophageal stricture with EGD and dilation   -Tagamet 150 mg tablet qhs  -Well controlled with diet.   -He did not follow up with GI to schedule colonoscopy and EGD     Hyperlipidemia  -Lipid panel from 10/26/2023 showed tchol of 261, HDL 57, .6, Trigs 117  -he states he is eating healthier now and trying to lose weight. He states he is running 1-2 times a week and mountain biking as well. He states the weather is limiting his activity right now.      Overweight  -BMI 27.55, weight 192 lbs     Health maintenance  -He did not follow up with GI to schedule colonoscopy and EGD    Today:   Patient states he has a toenail fungus and it was treated a few years back and he states he did not think it helped much.     Review of Systems   All other systems reviewed and are negative.     Past Medical History:   Diagnosis Date    GERD (gastroesophageal reflux disease)     Mixed hyperlipidemia 9/15/2022    Overweight (BMI 25.0-29.9) 9/15/2022    Rash 2016     Social History     Socioeconomic History    Marital status:      Spouse name: Not on file    Number of children: Not on file    Years of education: Not on file    Highest education level: Not on file   Occupational History    Not on file   Tobacco Use    Smoking status: Never     Passive exposure: Never

## 2024-07-11 LAB
HCV AB SERPL QL IA: NORMAL
HCV IGG SERPL QL IA: NON REACTIVE S/CO RATIO

## 2024-07-11 NOTE — RESULT ENCOUNTER NOTE
Patient's labs are stable, his cholesterol levels are also stable, his LDL did go up slightly from 154.2 up to 165, he should continue focusing on eating healthy and getting enough exercise to bring this number closer to 100.

## 2024-11-07 ENCOUNTER — APPOINTMENT (RX ONLY)
Dept: URBAN - METROPOLITAN AREA CLINIC 329 | Facility: CLINIC | Age: 49
Setting detail: DERMATOLOGY
End: 2024-11-07

## 2024-11-07 DIAGNOSIS — Z12.83 ENCOUNTER FOR SCREENING FOR MALIGNANT NEOPLASM OF SKIN: ICD-10-CM

## 2024-11-07 DIAGNOSIS — L82.1 OTHER SEBORRHEIC KERATOSIS: ICD-10-CM

## 2024-11-07 DIAGNOSIS — L57.8 OTHER SKIN CHANGES DUE TO CHRONIC EXPOSURE TO NONIONIZING RADIATION: ICD-10-CM

## 2024-11-07 DIAGNOSIS — B35.1 TINEA UNGUIUM: ICD-10-CM | Status: INADEQUATELY CONTROLLED

## 2024-11-07 DIAGNOSIS — D18.0 HEMANGIOMA: ICD-10-CM

## 2024-11-07 DIAGNOSIS — Z71.89 OTHER SPECIFIED COUNSELING: ICD-10-CM

## 2024-11-07 DIAGNOSIS — L81.4 OTHER MELANIN HYPERPIGMENTATION: ICD-10-CM

## 2024-11-07 DIAGNOSIS — D22 MELANOCYTIC NEVI: ICD-10-CM

## 2024-11-07 PROBLEM — D22.61 MELANOCYTIC NEVI OF RIGHT UPPER LIMB, INCLUDING SHOULDER: Status: ACTIVE | Noted: 2024-11-07

## 2024-11-07 PROBLEM — D22.5 MELANOCYTIC NEVI OF TRUNK: Status: ACTIVE | Noted: 2024-11-07

## 2024-11-07 PROBLEM — D22.72 MELANOCYTIC NEVI OF LEFT LOWER LIMB, INCLUDING HIP: Status: ACTIVE | Noted: 2024-11-07

## 2024-11-07 PROBLEM — D22.71 MELANOCYTIC NEVI OF RIGHT LOWER LIMB, INCLUDING HIP: Status: ACTIVE | Noted: 2024-11-07

## 2024-11-07 PROBLEM — D18.01 HEMANGIOMA OF SKIN AND SUBCUTANEOUS TISSUE: Status: ACTIVE | Noted: 2024-11-07

## 2024-11-07 PROCEDURE — 99203 OFFICE O/P NEW LOW 30 MIN: CPT | Mod: 25

## 2024-11-07 PROCEDURE — ? FULL BODY SKIN EXAM

## 2024-11-07 PROCEDURE — ? BODY PHOTOGRAPHY

## 2024-11-07 PROCEDURE — ? PRESCRIPTION MEDICATION MANAGEMENT

## 2024-11-07 PROCEDURE — ? SUNSCREEN RECOMMENDATIONS

## 2024-11-07 PROCEDURE — 96904 WHOLE BODY PHOTOGRAPHY: CPT

## 2024-11-07 PROCEDURE — ? TREATMENT REGIMEN

## 2024-11-07 PROCEDURE — ? COUNSELING

## 2024-11-07 ASSESSMENT — LOCATION DETAILED DESCRIPTION DERM
LOCATION DETAILED: LEFT PROXIMAL POSTERIOR THIGH
LOCATION DETAILED: LEFT ANTERIOR DISTAL UPPER ARM
LOCATION DETAILED: RIGHT ANTERIOR DISTAL THIGH
LOCATION DETAILED: RIGHT ANTERIOR DISTAL UPPER ARM
LOCATION DETAILED: UPPER STERNUM
LOCATION DETAILED: MID-FRONTAL SCALP
LOCATION DETAILED: LEFT DISTAL POSTERIOR THIGH
LOCATION DETAILED: SUPERIOR THORACIC SPINE
LOCATION DETAILED: RIGHT GREAT TOENAIL
LOCATION DETAILED: LEFT DISTAL CALF
LOCATION DETAILED: RIGHT SUPERIOR MEDIAL UPPER BACK
LOCATION DETAILED: RIGHT VENTRAL DISTAL FOREARM
LOCATION DETAILED: LEFT CENTRAL MALAR CHEEK
LOCATION DETAILED: EPIGASTRIC SKIN
LOCATION DETAILED: RIGHT MEDIAL INFERIOR CHEST
LOCATION DETAILED: RIGHT PROXIMAL DORSAL FOREARM
LOCATION DETAILED: RIGHT CLAVICULAR SKIN
LOCATION DETAILED: LEFT INFERIOR UPPER BACK

## 2024-11-07 ASSESSMENT — LOCATION SIMPLE DESCRIPTION DERM
LOCATION SIMPLE: RIGHT CLAVICULAR SKIN
LOCATION SIMPLE: RIGHT UPPER BACK
LOCATION SIMPLE: ANTERIOR SCALP
LOCATION SIMPLE: UPPER BACK
LOCATION SIMPLE: RIGHT FOREARM
LOCATION SIMPLE: RIGHT UPPER ARM
LOCATION SIMPLE: CHEST
LOCATION SIMPLE: LEFT POSTERIOR THIGH
LOCATION SIMPLE: ABDOMEN
LOCATION SIMPLE: RIGHT THIGH
LOCATION SIMPLE: LEFT UPPER ARM
LOCATION SIMPLE: LEFT CHEEK
LOCATION SIMPLE: RIGHT GREAT TOE
LOCATION SIMPLE: LEFT UPPER BACK
LOCATION SIMPLE: LEFT CALF

## 2024-11-07 ASSESSMENT — LOCATION ZONE DERM
LOCATION ZONE: FACE
LOCATION ZONE: TRUNK
LOCATION ZONE: ARM
LOCATION ZONE: TOENAIL
LOCATION ZONE: LEG
LOCATION ZONE: SCALP

## 2024-11-07 NOTE — HPI: SKIN LESION
What Type Of Note Output Would You Prefer (Optional)?: Bullet Format
How Severe Is Your Skin Lesion?: mild
Is This A New Presentation, Or A Follow-Up?: Skin Lesions
Additional History: Patient denies anything new or changing. He wants to establish care with us.

## 2024-11-07 NOTE — PROCEDURE: PRESCRIPTION MEDICATION MANAGEMENT
Continue Regimen: Terbinafine (prescribed by pcp) \\nCiclopirox (prescribed by pcp)
Detail Level: Zone
Render In Strict Bullet Format?: No

## 2024-11-07 NOTE — PROCEDURE: BODY PHOTOGRAPHY
Was The Entire Body Photographed (Cannot Bill Unless Entire Body Photographed)?: Yes
Reason For Photography: The patient is obtaining whole body photography to observe existing suspicious moles and or monitor for the appearance of any new lesions.
Detail Level: Detailed
Number Of Photographs (Optional- Will Not Render If 0): 6
Consent was obtained for whole body photography. Patient understands that photograph costs may not be covered by insurance.
Whole Body Statement: The whole body was photographed today.

## 2024-12-15 DIAGNOSIS — B35.1 ONYCHOMYCOSIS OF GREAT TOE: ICD-10-CM

## 2024-12-16 RX ORDER — TERBINAFINE HYDROCHLORIDE 250 MG/1
250 TABLET ORAL DAILY
Qty: 90 TABLET | Refills: 3 | Status: SHIPPED | OUTPATIENT
Start: 2024-12-16

## 2025-07-10 ENCOUNTER — OFFICE VISIT (OUTPATIENT)
Dept: INTERNAL MEDICINE CLINIC | Facility: CLINIC | Age: 50
End: 2025-07-10
Payer: COMMERCIAL

## 2025-07-10 VITALS
DIASTOLIC BLOOD PRESSURE: 79 MMHG | WEIGHT: 200 LBS | HEART RATE: 60 BPM | BODY MASS INDEX: 28.7 KG/M2 | SYSTOLIC BLOOD PRESSURE: 135 MMHG | OXYGEN SATURATION: 99 %

## 2025-07-10 DIAGNOSIS — Z00.00 ANNUAL VISIT FOR GENERAL ADULT MEDICAL EXAMINATION WITHOUT ABNORMAL FINDINGS: Primary | ICD-10-CM

## 2025-07-10 DIAGNOSIS — G89.29 CHRONIC PAIN OF RIGHT KNEE: ICD-10-CM

## 2025-07-10 DIAGNOSIS — B35.1 ONYCHOMYCOSIS OF GREAT TOE: ICD-10-CM

## 2025-07-10 DIAGNOSIS — K21.9 GASTROESOPHAGEAL REFLUX DISEASE, UNSPECIFIED WHETHER ESOPHAGITIS PRESENT: ICD-10-CM

## 2025-07-10 DIAGNOSIS — M25.561 CHRONIC PAIN OF RIGHT KNEE: ICD-10-CM

## 2025-07-10 DIAGNOSIS — E66.3 OVERWEIGHT (BMI 25.0-29.9): ICD-10-CM

## 2025-07-10 DIAGNOSIS — E78.00 PURE HYPERCHOLESTEROLEMIA: ICD-10-CM

## 2025-07-10 LAB
ALBUMIN SERPL-MCNC: 4.2 G/DL (ref 3.5–5)
ALBUMIN/GLOB SERPL: 1.3 (ref 1–1.9)
ALP SERPL-CCNC: 54 U/L (ref 40–129)
ALT SERPL-CCNC: 31 U/L (ref 8–55)
ANION GAP SERPL CALC-SCNC: 10 MMOL/L (ref 7–16)
AST SERPL-CCNC: 25 U/L (ref 15–37)
BASOPHILS # BLD: 0.05 K/UL (ref 0–0.2)
BASOPHILS NFR BLD: 0.9 % (ref 0–2)
BILIRUB SERPL-MCNC: 0.5 MG/DL (ref 0–1.2)
BUN SERPL-MCNC: 13 MG/DL (ref 6–23)
CALCIUM SERPL-MCNC: 9.5 MG/DL (ref 8.8–10.2)
CHLORIDE SERPL-SCNC: 103 MMOL/L (ref 98–107)
CHOLEST SERPL-MCNC: 243 MG/DL (ref 0–200)
CO2 SERPL-SCNC: 24 MMOL/L (ref 20–29)
CREAT SERPL-MCNC: 1.13 MG/DL (ref 0.8–1.3)
DIFFERENTIAL METHOD BLD: NORMAL
EOSINOPHIL # BLD: 0.08 K/UL (ref 0–0.8)
EOSINOPHIL NFR BLD: 1.5 % (ref 0.5–7.8)
ERYTHROCYTE [DISTWIDTH] IN BLOOD BY AUTOMATED COUNT: 12.8 % (ref 11.9–14.6)
GLOBULIN SER CALC-MCNC: 3.1 G/DL (ref 2.3–3.5)
GLUCOSE SERPL-MCNC: 111 MG/DL (ref 70–99)
HCT VFR BLD AUTO: 45.3 % (ref 41.1–50.3)
HDLC SERPL-MCNC: 52 MG/DL (ref 40–60)
HDLC SERPL: 4.7 (ref 0–5)
HGB BLD-MCNC: 15.3 G/DL (ref 13.6–17.2)
IMM GRANULOCYTES # BLD AUTO: 0.08 K/UL (ref 0–0.5)
IMM GRANULOCYTES NFR BLD AUTO: 1.5 % (ref 0–5)
LDLC SERPL CALC-MCNC: 175 MG/DL (ref 0–100)
LYMPHOCYTES # BLD: 1.63 K/UL (ref 0.5–4.6)
LYMPHOCYTES NFR BLD: 30.9 % (ref 13–44)
MCH RBC QN AUTO: 31 PG (ref 26.1–32.9)
MCHC RBC AUTO-ENTMCNC: 33.8 G/DL (ref 31.4–35)
MCV RBC AUTO: 91.9 FL (ref 82–102)
MONOCYTES # BLD: 0.46 K/UL (ref 0.1–1.3)
MONOCYTES NFR BLD: 8.7 % (ref 4–12)
NEUTS SEG # BLD: 2.97 K/UL (ref 1.7–8.2)
NEUTS SEG NFR BLD: 56.5 % (ref 43–78)
NRBC # BLD: 0 K/UL (ref 0–0.2)
PLATELET # BLD AUTO: 253 K/UL (ref 150–450)
PMV BLD AUTO: 10.3 FL (ref 9.4–12.3)
POTASSIUM SERPL-SCNC: 4.2 MMOL/L (ref 3.5–5.1)
PROT SERPL-MCNC: 7.3 G/DL (ref 6.3–8.2)
RBC # BLD AUTO: 4.93 M/UL (ref 4.23–5.6)
SODIUM SERPL-SCNC: 137 MMOL/L (ref 136–145)
TRIGL SERPL-MCNC: 82 MG/DL (ref 0–150)
VLDLC SERPL CALC-MCNC: 16 MG/DL (ref 6–23)
WBC # BLD AUTO: 5.3 K/UL (ref 4.3–11.1)

## 2025-07-10 PROCEDURE — 99396 PREV VISIT EST AGE 40-64: CPT | Performed by: INTERNAL MEDICINE

## 2025-07-10 RX ORDER — CICLOPIROX OLAMINE 7.7 MG/G
CREAM TOPICAL
Qty: 1 EACH | Refills: 5 | Status: SHIPPED | OUTPATIENT
Start: 2025-07-10

## 2025-07-10 RX ORDER — TERBINAFINE HYDROCHLORIDE 250 MG/1
250 TABLET ORAL DAILY
Qty: 90 TABLET | Refills: 3 | Status: SHIPPED | OUTPATIENT
Start: 2025-07-10
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
